# Patient Record
Sex: FEMALE | URBAN - METROPOLITAN AREA
[De-identification: names, ages, dates, MRNs, and addresses within clinical notes are randomized per-mention and may not be internally consistent; named-entity substitution may affect disease eponyms.]

---

## 2022-05-31 ENCOUNTER — APPOINTMENT (RX ONLY)
Dept: URBAN - METROPOLITAN AREA CLINIC 129 | Facility: CLINIC | Age: 42
Setting detail: DERMATOLOGY
End: 2022-05-31

## 2022-05-31 DIAGNOSIS — D22 MELANOCYTIC NEVI: ICD-10-CM

## 2022-05-31 DIAGNOSIS — L72.0 EPIDERMAL CYST: ICD-10-CM

## 2022-05-31 DIAGNOSIS — L82.1 OTHER SEBORRHEIC KERATOSIS: ICD-10-CM

## 2022-05-31 PROBLEM — D22.39 MELANOCYTIC NEVI OF OTHER PARTS OF FACE: Status: ACTIVE | Noted: 2022-05-31

## 2022-05-31 PROCEDURE — ? COUNSELING

## 2022-05-31 PROCEDURE — ? ADDITIONAL NOTES

## 2022-05-31 PROCEDURE — 99203 OFFICE O/P NEW LOW 30 MIN: CPT

## 2022-05-31 ASSESSMENT — LOCATION SIMPLE DESCRIPTION DERM
LOCATION SIMPLE: CHIN
LOCATION SIMPLE: LEFT CHEEK
LOCATION SIMPLE: LEFT FOREHEAD

## 2022-05-31 ASSESSMENT — LOCATION DETAILED DESCRIPTION DERM
LOCATION DETAILED: RIGHT CHIN
LOCATION DETAILED: LEFT SUPERIOR CENTRAL MALAR CHEEK
LOCATION DETAILED: LEFT INFERIOR MEDIAL FOREHEAD

## 2022-05-31 ASSESSMENT — LOCATION ZONE DERM: LOCATION ZONE: FACE

## 2024-06-25 ENCOUNTER — APPOINTMENT (RX ONLY)
Dept: URBAN - METROPOLITAN AREA CLINIC 129 | Facility: CLINIC | Age: 44
Setting detail: DERMATOLOGY
End: 2024-06-25

## 2024-06-25 DIAGNOSIS — L98.8 OTHER SPECIFIED DISORDERS OF THE SKIN AND SUBCUTANEOUS TISSUE: ICD-10-CM

## 2024-06-25 PROCEDURE — ? ADDITIONAL NOTES

## 2024-06-25 PROCEDURE — ? COUNSELING

## 2024-06-25 PROCEDURE — ? RESTYLANE-L INJECTION

## 2024-06-25 PROCEDURE — ? INVENTORY

## 2024-06-25 PROCEDURE — ? PRESCRIPTION

## 2024-06-25 RX ORDER — TRETIONIN 0.25 MG/G
CREAM TOPICAL
Qty: 45 | Refills: 5 | Status: ERX | COMMUNITY
Start: 2024-06-25

## 2024-06-25 RX ADMIN — TRETIONIN: 0.25 CREAM TOPICAL at 00:00

## 2024-06-25 ASSESSMENT — LOCATION SIMPLE DESCRIPTION DERM
LOCATION SIMPLE: RIGHT CHEEK
LOCATION SIMPLE: LEFT CHEEK

## 2024-06-25 ASSESSMENT — LOCATION DETAILED DESCRIPTION DERM
LOCATION DETAILED: LEFT SUPERIOR CENTRAL BUCCAL CHEEK
LOCATION DETAILED: RIGHT SUPERIOR MEDIAL BUCCAL CHEEK

## 2024-06-25 ASSESSMENT — LOCATION ZONE DERM: LOCATION ZONE: FACE

## 2024-06-25 NOTE — PROCEDURE: RESTYLANE-L INJECTION
Map Statement: See Attached Map for Complete Details
Post-Care Instructions: Patient instructed to apply ice to reduce swelling.
Include Cannula Information In Note?: No
Additional Area 1 Volume In Cc: 0
Anesthesia Volume In Cc: 0.5
Marionette Lines Filler Volume In Cc: 1
Filler: Restylane -L
Anesthesia Type: 1% lidocaine with epinephrine
Detail Level: Detailed
Additional Anesthesia Volume In Cc: 6
Consent: Written consent obtained. Risks include but not limited to bruising, beading, irregular texture, ulceration, infection, allergic reaction, scar formation, incomplete augmentation, temporary nature, procedural pain.
Show Inventory Tab: Show
Procedural Text: The filler was administered to the treatment areas noted above.

## 2024-06-25 NOTE — PROCEDURE: ADDITIONAL NOTES
Render Risk Assessment In Note?: no
Additional Notes: Discussed Dysport, Botox, and filler treatments.
Detail Level: Simple
Additional Notes: 075